# Patient Record
Sex: FEMALE | Race: WHITE
[De-identification: names, ages, dates, MRNs, and addresses within clinical notes are randomized per-mention and may not be internally consistent; named-entity substitution may affect disease eponyms.]

---

## 2022-01-12 ENCOUNTER — HOSPITAL ENCOUNTER (EMERGENCY)
Dept: HOSPITAL 96 - M.ERS | Age: 77
Discharge: HOME | End: 2022-01-12
Payer: COMMERCIAL

## 2022-01-12 VITALS — SYSTOLIC BLOOD PRESSURE: 116 MMHG | DIASTOLIC BLOOD PRESSURE: 56 MMHG

## 2022-01-12 VITALS — BODY MASS INDEX: 23.3 KG/M2 | HEIGHT: 66 IN | WEIGHT: 145 LBS

## 2022-01-12 DIAGNOSIS — E87.6: ICD-10-CM

## 2022-01-12 DIAGNOSIS — Z91.041: ICD-10-CM

## 2022-01-12 DIAGNOSIS — Z79.899: ICD-10-CM

## 2022-01-12 DIAGNOSIS — R19.7: ICD-10-CM

## 2022-01-12 DIAGNOSIS — Z79.82: ICD-10-CM

## 2022-01-12 DIAGNOSIS — Z95.0: ICD-10-CM

## 2022-01-12 DIAGNOSIS — Z88.1: ICD-10-CM

## 2022-01-12 DIAGNOSIS — Z88.8: ICD-10-CM

## 2022-01-12 DIAGNOSIS — R51.9: ICD-10-CM

## 2022-01-12 DIAGNOSIS — U07.1: Primary | ICD-10-CM

## 2022-01-12 DIAGNOSIS — R05.9: ICD-10-CM

## 2022-01-12 LAB
ABSOLUTE BASOPHILS: 0 THOU/UL (ref 0–0.2)
ABSOLUTE EOSINOPHILS: 0.1 THOU/UL (ref 0–0.7)
ABSOLUTE MONOCYTES: 0.4 THOU/UL (ref 0–1.2)
ALBUMIN SERPL-MCNC: 2 G/DL (ref 3.4–5)
ALP SERPL-CCNC: 123 U/L (ref 46–116)
ALT SERPL-CCNC: 33 U/L (ref 30–65)
ANION GAP SERPL CALC-SCNC: 11 MMOL/L (ref 7–16)
APTT BLD: 33.2 SECONDS (ref 25–31.3)
AST SERPL-CCNC: 126 U/L (ref 15–37)
BASOPHILS NFR BLD AUTO: 0.7 %
BILIRUB SERPL-MCNC: 1.1 MG/DL
BUN SERPL-MCNC: 9 MG/DL (ref 7–18)
CALCIUM SERPL-MCNC: 7.6 MG/DL (ref 8.5–10.1)
CHLORIDE SERPL-SCNC: 103 MMOL/L (ref 98–107)
CO2 SERPL-SCNC: 29 MMOL/L (ref 21–32)
CREAT SERPL-MCNC: 0.6 MG/DL (ref 0.6–1.3)
EOSINOPHIL NFR BLD: 1.4 %
GLUCOSE SERPL-MCNC: 63 MG/DL (ref 70–99)
GRANULOCYTES NFR BLD MANUAL: 60.3 %
HCT VFR BLD CALC: 39.6 % (ref 37–47)
HGB BLD-MCNC: 12.6 GM/DL (ref 12–15)
INR PPP: 1.2
LYMPHOCYTES # BLD: 1 THOU/UL (ref 0.8–5.3)
LYMPHOCYTES NFR BLD AUTO: 28.1 %
MAGNESIUM SERPL-MCNC: 1.6 MG/DL (ref 1.8–2.4)
MCH RBC QN AUTO: 28.8 PG (ref 26–34)
MCHC RBC AUTO-ENTMCNC: 31.7 G/DL (ref 28–37)
MCV RBC: 90.7 FL (ref 80–100)
MONOCYTES NFR BLD: 9.5 %
MPV: 6.9 FL. (ref 7.2–11.1)
NEUTROPHILS # BLD: 2.2 THOU/UL (ref 1.6–8.1)
NUCLEATED RBCS: 0 /100WBC
PLATELET COUNT*: 108 THOU/UL (ref 150–400)
POTASSIUM SERPL-SCNC: 2.7 MMOL/L (ref 3.5–5.1)
PROT SERPL-MCNC: 5.3 G/DL (ref 6.4–8.2)
PROTHROMBIN TIME: 12 SECONDS (ref 9.2–11.5)
RBC # BLD AUTO: 4.36 MIL/UL (ref 4.2–5)
RDW-CV: 16.8 % (ref 10.5–14.5)
SODIUM SERPL-SCNC: 143 MMOL/L (ref 136–145)
WBC # BLD AUTO: 3.7 THOU/UL (ref 4–11)

## 2022-01-12 NOTE — EKG
[place] [address]
Phone:  [phone]                     ELECTROCARDIOGRAM REPORT      
_______________________________________________________________________________
 
Name:         MARY MENDOZA               Room:                     [facility]#:    [id number]     Account #:     [id number]  
Admission:    22    Attend Phys:                     
Discharge:    22    Date of Birth: [birth year]  
Date of Service: 22 0143  Report #:      [id number]
        [id number]
_______________________________________________________________________________
THIS REPORT FOR:  //name//                      
 
                         [facility] ED
                                       
Test Date:    2022               Test Time:    01:43:16
Pat Name:     MARYPEDRO MENDOZA            Department:   
Patient ID:   [id number]            Room:          
Gender:                               Technician:   
:          1945               Requested By: Josephine Muñoz
Order Number: [id number]    Karol MD:   Hao Perez
                                 Measurements
Intervals                              Axis          
Rate:         79                       P:            7
VA:           385                      QRS:          142
QRSD:         106                      T:            153
QT:           472                                    
QTc:          542                                    
                           Interpretive Statements
Sinus or accelerated junctional rhythm
Possible right ventricular hypertrophy
Nonspecific T abnrm over right precordium; consider ischemia or RV strain
Prolonged QT interval
No previous ECG available for comparison
Electronically Signed On 2022 13:47:19 CST by Hao Perez
[email or web address]
 
 
 
 
 
 
 
 
 
 
 
 
 
 
 
 
 
 
 
  <ELECTRONICALLY SIGNED>
                                           By: Hao Perez MD, [facility]     
  22     1347
D: 22 0143   _____________________________________
T: 22 0143   Hao Perez MD, [facility]       /EPI